# Patient Record
Sex: MALE | Race: WHITE | Employment: OTHER | URBAN - METROPOLITAN AREA
[De-identification: names, ages, dates, MRNs, and addresses within clinical notes are randomized per-mention and may not be internally consistent; named-entity substitution may affect disease eponyms.]

---

## 2023-11-20 ENCOUNTER — OFFICE VISIT (OUTPATIENT)
Age: 78
End: 2023-11-20
Payer: MEDICARE

## 2023-11-20 VITALS
HEIGHT: 74 IN | DIASTOLIC BLOOD PRESSURE: 74 MMHG | WEIGHT: 180 LBS | OXYGEN SATURATION: 93 % | SYSTOLIC BLOOD PRESSURE: 149 MMHG | BODY MASS INDEX: 23.1 KG/M2 | HEART RATE: 68 BPM | RESPIRATION RATE: 18 BRPM

## 2023-11-20 DIAGNOSIS — G30.1 MODERATE LATE ONSET ALZHEIMER'S DEMENTIA WITHOUT BEHAVIORAL DISTURBANCE, PSYCHOTIC DISTURBANCE, MOOD DISTURBANCE, OR ANXIETY (HCC): Primary | ICD-10-CM

## 2023-11-20 DIAGNOSIS — F02.B0 MODERATE LATE ONSET ALZHEIMER'S DEMENTIA WITHOUT BEHAVIORAL DISTURBANCE, PSYCHOTIC DISTURBANCE, MOOD DISTURBANCE, OR ANXIETY (HCC): Primary | ICD-10-CM

## 2023-11-20 DIAGNOSIS — G30.1 MODERATE LATE ONSET ALZHEIMER'S DEMENTIA WITHOUT BEHAVIORAL DISTURBANCE, PSYCHOTIC DISTURBANCE, MOOD DISTURBANCE, OR ANXIETY (HCC): ICD-10-CM

## 2023-11-20 DIAGNOSIS — F02.B0 MODERATE LATE ONSET ALZHEIMER'S DEMENTIA WITHOUT BEHAVIORAL DISTURBANCE, PSYCHOTIC DISTURBANCE, MOOD DISTURBANCE, OR ANXIETY (HCC): ICD-10-CM

## 2023-11-20 LAB
COMMENT:: NORMAL
SPECIMEN HOLD: NORMAL
T4 FREE SERPL-MCNC: 0.8 NG/DL (ref 0.8–1.5)
TSH SERPL DL<=0.05 MIU/L-ACNC: 5.4 UIU/ML (ref 0.36–3.74)
VIT B12 SERPL-MCNC: 371 PG/ML (ref 193–986)

## 2023-11-20 PROCEDURE — 4004F PT TOBACCO SCREEN RCVD TLK: CPT | Performed by: PSYCHIATRY & NEUROLOGY

## 2023-11-20 PROCEDURE — 99204 OFFICE O/P NEW MOD 45 MIN: CPT | Performed by: PSYCHIATRY & NEUROLOGY

## 2023-11-20 PROCEDURE — 1123F ACP DISCUSS/DSCN MKR DOCD: CPT | Performed by: PSYCHIATRY & NEUROLOGY

## 2023-11-20 PROCEDURE — G8420 CALC BMI NORM PARAMETERS: HCPCS | Performed by: PSYCHIATRY & NEUROLOGY

## 2023-11-20 PROCEDURE — G8428 CUR MEDS NOT DOCUMENT: HCPCS | Performed by: PSYCHIATRY & NEUROLOGY

## 2023-11-20 PROCEDURE — G8484 FLU IMMUNIZE NO ADMIN: HCPCS | Performed by: PSYCHIATRY & NEUROLOGY

## 2023-11-20 PROCEDURE — 96138 PSYCL/NRPSYC TECH 1ST: CPT | Performed by: PSYCHIATRY & NEUROLOGY

## 2023-11-20 PROCEDURE — 96132 NRPSYC TST EVAL PHYS/QHP 1ST: CPT | Performed by: PSYCHIATRY & NEUROLOGY

## 2023-11-20 RX ORDER — ROSUVASTATIN CALCIUM 20 MG/1
TABLET, COATED ORAL
COMMUNITY
Start: 2023-09-12

## 2023-11-20 RX ORDER — TAMSULOSIN HYDROCHLORIDE 0.4 MG/1
0.8 CAPSULE ORAL NIGHTLY
COMMUNITY
Start: 2022-04-06

## 2023-11-20 RX ORDER — LANOLIN ALCOHOL/MO/W.PET/CERES
500 CREAM (GRAM) TOPICAL NIGHTLY
COMMUNITY

## 2023-11-20 RX ORDER — DONEPEZIL HYDROCHLORIDE 5 MG/1
5 TABLET, FILM COATED ORAL NIGHTLY
Qty: 30 TABLET | Refills: 0 | Status: SHIPPED | OUTPATIENT
Start: 2023-11-20 | End: 2023-12-20

## 2023-11-20 RX ORDER — VIT C/B6/B5/MAGNESIUM/HERB 173 50-5-6-5MG
CAPSULE ORAL DAILY
COMMUNITY

## 2023-11-20 RX ORDER — DONEPEZIL HYDROCHLORIDE 10 MG/1
10 TABLET, FILM COATED ORAL DAILY
Qty: 90 TABLET | Refills: 3 | Status: SHIPPED | OUTPATIENT
Start: 2023-11-20

## 2023-11-20 RX ORDER — UBIDECARENONE 75 MG
CAPSULE ORAL
COMMUNITY

## 2023-11-20 RX ORDER — GABAPENTIN 600 MG/1
1200 TABLET ORAL NIGHTLY
COMMUNITY
Start: 2022-10-11

## 2023-11-20 RX ORDER — MULTIVIT WITH MINERALS/LUTEIN
1000 TABLET ORAL DAILY
COMMUNITY

## 2023-11-20 RX ORDER — SENNOSIDES A AND B 8.6 MG/1
1 TABLET, FILM COATED ORAL DAILY
COMMUNITY

## 2023-11-20 ASSESSMENT — PATIENT HEALTH QUESTIONNAIRE - PHQ9
2. FEELING DOWN, DEPRESSED OR HOPELESS: 0
SUM OF ALL RESPONSES TO PHQ QUESTIONS 1-9: 0
1. LITTLE INTEREST OR PLEASURE IN DOING THINGS: 0
SUM OF ALL RESPONSES TO PHQ9 QUESTIONS 1 & 2: 0
SUM OF ALL RESPONSES TO PHQ QUESTIONS 1-9: 0

## 2023-11-20 NOTE — PATIENT INSTRUCTIONS
[facility] Neuroscience Test Result Communication    Test results are available in [address] [place] [address]. [employer] is the patient portal into our electronic health record. This feature allows patients to see diagnostic test results, immunizations, allergies, past medical and surgical history, current medications, and send messages directly to providers. Our team members at the  can provide additional information and assist with registration. The [employer] support team can be reached at [phone]. In some cases, a provider might need time to explain the results in detail during a follow-up appointment. This might include additional information or context that will help patients understand the reason for next steps in the plan of care recommended by their provider. If a patient chooses to receive diagnostic testing at an imaging center outside of the [facility], it is the patient's responsibility to bring the imaging report and disc to their [address] [place] [address] follow-up appointment. If the test results reveal anything that is particularly noteworthy, we will contact you to discuss the matter and, if necessary, schedule a follow-up appointment at an earlier date. If you have not received your test results by [employer] or other communication within 7 days, please contact our office. An inquiry can be sent to your provider using [employer]. Alternatively, appointments can be scheduled via telephone to review results. If a follow-up appointment to review results has not been scheduled, [facility] office can be reached at [phone]. For appointments at our [facility] or [facility] office, please call [phone]. [address] [facility]   Statement to Patients  April 1, 2014      In an effort to ensure the large volume of patient prescription refills is processed in the most efficient and expeditious

## 2023-11-28 ENCOUNTER — TELEPHONE (OUTPATIENT)
Age: 78
End: 2023-11-28

## 2023-11-28 NOTE — TELEPHONE ENCOUNTER
----- Message from Luis Eduardo White MD sent at 11/21/2023 12:49 PM EST -----  Forward to PCP pls    ----- Message -----  From: Fanny Andino Incoming [employer]  Sent: 11/20/2023   8:40 PM EST  To: Luis Eduardo Whiet MD

## 2023-12-06 ENCOUNTER — PROCEDURE VISIT (OUTPATIENT)
Age: 78
End: 2023-12-06

## 2023-12-06 DIAGNOSIS — R41.0 CONFUSION: Primary | ICD-10-CM

## 2023-12-13 ENCOUNTER — HOSPITAL ENCOUNTER (OUTPATIENT)
Facility: HOSPITAL | Age: 78
Discharge: HOME | End: 2023-12-16
Attending: PSYCHIATRY & NEUROLOGY
Payer: MEDICARE

## 2023-12-13 DIAGNOSIS — F02.B0 MODERATE LATE ONSET ALZHEIMER'S DEMENTIA WITHOUT BEHAVIORAL DISTURBANCE, PSYCHOTIC DISTURBANCE, MOOD DISTURBANCE, OR ANXIETY (HCC): ICD-10-CM

## 2023-12-13 DIAGNOSIS — G30.1 MODERATE LATE ONSET ALZHEIMER'S DEMENTIA WITHOUT BEHAVIORAL DISTURBANCE, PSYCHOTIC DISTURBANCE, MOOD DISTURBANCE, OR ANXIETY (HCC): ICD-10-CM

## 2023-12-13 PROCEDURE — 70551 MRI BRAIN STEM W/O DYE: CPT

## 2023-12-15 ENCOUNTER — TELEPHONE (OUTPATIENT)
Age: 78
End: 2023-12-15

## 2023-12-15 DIAGNOSIS — J33.9 NASAL POLYP: Primary | ICD-10-CM

## 2023-12-15 NOTE — TELEPHONE ENCOUNTER
----- Message from Johnny Nj MD sent at 12/14/2023  8:13 AM EST -----  Pls refer to Dr. Judd Galeas sec to finding on MRI    ----- Message -----  From: Fanny Andino Incoming Orders Results To [employer]  Sent: 12/13/2023   9:56 PM EST  To: Johnny Nj MD

## 2023-12-15 NOTE — TELEPHONE ENCOUNTER
Pt and his wife notified about MRI report and referral.  Faxed order and report to Dr. Carlos Patel' office

## 2024-01-10 ENCOUNTER — OFFICE VISIT (OUTPATIENT)
Age: 79
End: 2024-01-10
Payer: MEDICARE

## 2024-01-10 VITALS
SYSTOLIC BLOOD PRESSURE: 125 MMHG | DIASTOLIC BLOOD PRESSURE: 61 MMHG | HEART RATE: 73 BPM | OXYGEN SATURATION: 95 % | RESPIRATION RATE: 16 BRPM

## 2024-01-10 DIAGNOSIS — G30.1 MODERATE LATE ONSET ALZHEIMER'S DEMENTIA WITHOUT BEHAVIORAL DISTURBANCE, PSYCHOTIC DISTURBANCE, MOOD DISTURBANCE, OR ANXIETY (HCC): Primary | ICD-10-CM

## 2024-01-10 DIAGNOSIS — F02.B0 MODERATE LATE ONSET ALZHEIMER'S DEMENTIA WITHOUT BEHAVIORAL DISTURBANCE, PSYCHOTIC DISTURBANCE, MOOD DISTURBANCE, OR ANXIETY (HCC): Primary | ICD-10-CM

## 2024-01-10 PROCEDURE — 4004F PT TOBACCO SCREEN RCVD TLK: CPT | Performed by: PSYCHIATRY & NEUROLOGY

## 2024-01-10 PROCEDURE — 1123F ACP DISCUSS/DSCN MKR DOCD: CPT | Performed by: PSYCHIATRY & NEUROLOGY

## 2024-01-10 PROCEDURE — G8427 DOCREV CUR MEDS BY ELIG CLIN: HCPCS | Performed by: PSYCHIATRY & NEUROLOGY

## 2024-01-10 PROCEDURE — G8420 CALC BMI NORM PARAMETERS: HCPCS | Performed by: PSYCHIATRY & NEUROLOGY

## 2024-01-10 PROCEDURE — 99214 OFFICE O/P EST MOD 30 MIN: CPT | Performed by: PSYCHIATRY & NEUROLOGY

## 2024-01-10 PROCEDURE — G8484 FLU IMMUNIZE NO ADMIN: HCPCS | Performed by: PSYCHIATRY & NEUROLOGY

## 2024-01-10 NOTE — PROGRESS NOTES
Patient is here to go over memory work up. Patient did remember coming into the office and it being decorated for the holidays. The patient did also remember my face from the first visit.   Patient had the inspire placed 12/22/23 and it will be turned in the next few weeks.   
been going well in that regard.  They did have to reschedule the driving evaluation with a we will get that rescheduled.    His Workup has included  Carotid Doppler with insignificant stenosis  MRI of the brain with a lesion in the right nasal cavity.  Unremarkable brain parenchyma with age-related volume loss and microvascular change.  EEG with a slow background at 7 cps    TSH was elevated at 5.4  Free T4 normal  B12 normal    Laboratory analysis from Dr. Kelly November 30, 2023  Thyroid peroxidase antibody normal  Thyroglobulin antibody unremarkable  Thyrotropin receptor unremarkable  Direct antiglobulin test negative  Reticulocytes low  B12 repeat 294  Transferrin low at 189  Iron normal  Folate normal  Ferritin elevated 465  TSH normal  Free T4 normal  T3 normal  Lipid panel with an LDL 60  Magnesium normal  Comprehensive metabolic profile unremarkable    30 November 2023 visit with Dr. Kelly where abnormal thyroid function tests were noted.  He was sent for further testing as above.  Also dyslipidemia and labs were checked.  Anemia and labs were checked.  Otolaryngology visit with Dr. Rodriguez December 22, 2023 where the patient had implantation of the inspire hypoglossal nerve stimulator for his obstructive sleep apnea    Office visit with Dr. Dinesh Perez of [facility] where he evaluated Mr. Johnson regarding the polyp.  A biopsy was planned.    Examination  /61 (Site: Right Upper Arm, Position: Sitting, Cuff Size: Medium Adult)   Pulse 73   Resp 16   SpO2 95%   He is a pleasant engaging gentleman.  He is awake and alert.  He discusses the current events of his medical history.    Impression/Plan  78-year-old gentleman likely mild/moderate Alzheimer's type dementia  Continue with Aricept 10 mg daily  Follow-up 6 months    Radha Shepard MD        This note was created using voice recognition software. Despite editing, there may be syntax errors.

## 2024-01-26 ENCOUNTER — HOSPITAL ENCOUNTER (OUTPATIENT)
Facility: HOSPITAL | Age: 79
Setting detail: SPECIMEN
Discharge: HOME | End: 2024-01-29

## 2024-03-15 RX ORDER — DONEPEZIL HYDROCHLORIDE 5 MG/1
5 TABLET, FILM COATED ORAL NIGHTLY
Qty: 30 TABLET | Refills: 0 | OUTPATIENT
Start: 2024-03-15 | End: 2024-04-14

## 2024-06-12 RX ORDER — DONEPEZIL HYDROCHLORIDE 5 MG/1
5 TABLET, FILM COATED ORAL NIGHTLY
Qty: 30 TABLET | Refills: 0 | OUTPATIENT
Start: 2024-06-12 | End: 2024-07-12

## 2024-07-10 ENCOUNTER — OFFICE VISIT (OUTPATIENT)
Age: 79
End: 2024-07-10

## 2024-07-10 VITALS
SYSTOLIC BLOOD PRESSURE: 163 MMHG | OXYGEN SATURATION: 95 % | DIASTOLIC BLOOD PRESSURE: 89 MMHG | HEART RATE: 63 BPM | RESPIRATION RATE: 16 BRPM

## 2024-07-10 DIAGNOSIS — G30.9 ALZHEIMER'S DISEASE, UNSPECIFIED (CODE) (HCC): Primary | ICD-10-CM

## 2024-07-10 DIAGNOSIS — R41.3 MEMORY DIFFICULTY: ICD-10-CM

## 2024-07-10 RX ORDER — MEMANTINE HYDROCHLORIDE 10 MG/1
10 TABLET ORAL 2 TIMES DAILY
Qty: 180 TABLET | Refills: 1 | Status: SHIPPED | OUTPATIENT
Start: 2024-07-10

## 2024-07-10 RX ORDER — MEMANTINE HYDROCHLORIDE 5 MG/1
TABLET ORAL
Qty: 42 TABLET | Refills: 0 | Status: SHIPPED | OUTPATIENT
Start: 2024-07-10

## 2024-07-10 RX ORDER — DONEPEZIL HYDROCHLORIDE 10 MG/1
10 TABLET, FILM COATED ORAL DAILY
Qty: 90 TABLET | Refills: 3 | Status: SHIPPED | OUTPATIENT
Start: 2024-07-10

## 2024-07-10 NOTE — PROGRESS NOTES
[address]88[address] (16 minute minimum)  20 minutes were spent administering cognitive testing by nurse.    Cognitive Testing Evaluation     Introduction:     Serjio Johnson  1945  Male   This 79 year old Male was administered a battery of neurocognitive testing on 07/10/2024.     Tests Administered:     Trails A, Trails B, Digit Symbol Substitution, Stroop, Immediate Recognition, Delayed Recognition   The combined test administration time was 16 minutes   Test Results:   Cognitive testing was provided via a battery of cognitive assessments. The pattern of test scores indicate that results are valid.  A Clinical Report with further description of scores and results is also available.   Overall: Patient tested in the 1st* percentile (standard score of 64*).   Trails A: Patient tested in the 21st percentile (scaled standard score of 88).  Trails B: Patient tested in the --* percentile (scaled standard score of null).  Digit Symbol Substitution: Patient tested in the 1st percentile (scaled standard score of 10).  Stroop: Patient tested in the 12th percentile (scaled standard score of 83).  Immediate Recognition: Patient tested in the 1st percentile (scaled standard score of 66).  Delayed Recognition: Patient tested in the 44th percentile (scaled standard score of 98).   * These assessments were not scored because they were potentially invalid, or the patient failed to complete in the allotted time.   Interpretation of Test Scores:     Examination of individual component tests shows:   Attention - Trails A: unlikely impairment  Mental Flexibility - Trails B: possible impairment  Executive Function - Digit Symbol Substitution: likely impairment  Executive Function - Stroop: possible impairment  Memory - Immediate Recognition: likely impairment  Memory - Delayed Recognition: unlikely impairment    The patient’s overall cognitive test performance was in the 1st percentile when compared to individuals of a similar age and 
Luis from Nov    Overall: Patient tested in the 3rd* percentile (standard score of 72*   
formalformal driving evaluation.  See scanned media.  Memory has been stable    Previous diagnostics  Carotid Doppler with insignificant stenosis  MRI of the brain with age-related microvascular changes including the microhemorrhages as noted above.  Age-related volume loss and microvascular change.  EEG with a slow background at 7 cps     TSH 5.4  Free T4 normal  B12 normal    Examination  BP (!) 163/89   Pulse 63   Resp 16   SpO2 95%   Pleasant engaging gentleman.  He is awake alert oriented to the correct day date and year.  He discusses the politics ongoing with Maryann and Kadeem.  He notes the leg[place] border [place] being something prominent in the news.    [address] (16 minute minimum)  20 minutes were spent administering cognitive testing by nurse.     Cognitive Testing Evaluation   Introduction:      Serjio Johnson  1945  Male   This 79 year old Male was administered a battery of neurocognitive testing on 07/10/2024.      Tests Administered:      Trails A, Trails B, Digit Symbol Substitution, Stroop, Immediate Recognition, Delayed Recognition   The combined test administration time was 16 minutes   Test Results:   Cognitive testing was provided via a battery of cognitive assessments. The pattern of test scores indicate that results are valid.  A Clinical Report with further description of scores and results is also available.   Overall: Patient tested in the 1st* percentile (standard score of 64*).   Trails A: Patient tested in the 21st percentile (scaled standard score of 88).  Trails B: Patient tested in the --* percentile (scaled standard score of null).  Digit Symbol Substitution: Patient tested in the 1st percentile (scaled standard score of 10).  Stroop: Patient tested in the 12th percentile (scaled standard score of 83).  Immediate Recognition: Patient tested in the 1st percentile (scaled standard score of 66).  Delayed Recognition: Patient tested in the 44th percentile (scaled

## 2024-07-29 RX ORDER — MEMANTINE HYDROCHLORIDE 5 MG/1
TABLET ORAL
Qty: 42 TABLET | Refills: 0 | OUTPATIENT
Start: 2024-07-29

## 2024-10-28 RX ORDER — MEMANTINE HYDROCHLORIDE 10 MG/1
10 TABLET ORAL 2 TIMES DAILY
Qty: 180 TABLET | Refills: 1 | Status: SHIPPED | OUTPATIENT
Start: 2024-10-28

## 2024-10-28 RX ORDER — MEMANTINE HYDROCHLORIDE 5 MG/1
TABLET ORAL
Qty: 42 TABLET | Refills: 0 | OUTPATIENT
Start: 2024-10-28

## 2025-01-15 ENCOUNTER — OFFICE VISIT (OUTPATIENT)
Age: 80
End: 2025-01-15
Payer: MEDICARE

## 2025-01-15 VITALS
HEART RATE: 93 BPM | RESPIRATION RATE: 16 BRPM | BODY MASS INDEX: 23.1 KG/M2 | SYSTOLIC BLOOD PRESSURE: 149 MMHG | HEIGHT: 74 IN | OXYGEN SATURATION: 64 % | DIASTOLIC BLOOD PRESSURE: 78 MMHG | WEIGHT: 180 LBS

## 2025-01-15 DIAGNOSIS — G30.1 MODERATE LATE ONSET ALZHEIMER'S DEMENTIA WITH AGITATION (HCC): Primary | ICD-10-CM

## 2025-01-15 DIAGNOSIS — F02.B11 MODERATE LATE ONSET ALZHEIMER'S DEMENTIA WITH AGITATION (HCC): Primary | ICD-10-CM

## 2025-01-15 PROCEDURE — G8427 DOCREV CUR MEDS BY ELIG CLIN: HCPCS | Performed by: PSYCHIATRY & NEUROLOGY

## 2025-01-15 PROCEDURE — 1159F MED LIST DOCD IN RCRD: CPT | Performed by: PSYCHIATRY & NEUROLOGY

## 2025-01-15 PROCEDURE — 99214 OFFICE O/P EST MOD 30 MIN: CPT | Performed by: PSYCHIATRY & NEUROLOGY

## 2025-01-15 PROCEDURE — 1036F TOBACCO NON-USER: CPT | Performed by: PSYCHIATRY & NEUROLOGY

## 2025-01-15 PROCEDURE — 1126F AMNT PAIN NOTED NONE PRSNT: CPT | Performed by: PSYCHIATRY & NEUROLOGY

## 2025-01-15 PROCEDURE — 1123F ACP DISCUSS/DSCN MKR DOCD: CPT | Performed by: PSYCHIATRY & NEUROLOGY

## 2025-01-15 PROCEDURE — G8420 CALC BMI NORM PARAMETERS: HCPCS | Performed by: PSYCHIATRY & NEUROLOGY

## 2025-01-15 RX ORDER — BUDESONIDE 3 MG/1
CAPSULE, COATED PELLETS ORAL
COMMUNITY
Start: 2024-08-15

## 2025-01-15 RX ORDER — DONEPEZIL HYDROCHLORIDE 10 MG/1
10 TABLET, FILM COATED ORAL DAILY
Qty: 90 TABLET | Refills: 3 | Status: SHIPPED | OUTPATIENT
Start: 2025-01-15

## 2025-01-15 RX ORDER — MEMANTINE HYDROCHLORIDE 10 MG/1
10 TABLET ORAL 2 TIMES DAILY
Qty: 180 TABLET | Refills: 3 | Status: SHIPPED | OUTPATIENT
Start: 2025-01-15

## 2025-01-15 NOTE — PROGRESS NOTES
[facility] at [facility] at [address] [place] [address] [address] [facility] [address] [place] [address] [phone]              Chief Complaint   Patient presents with    Progressive dementia likely Alzheimer's type     Memantine 10 mg BID, Donepezil 10 mg daily      Current Outpatient Medications   Medication Sig Dispense Refill    budesonide (ENTOCORT EC) 3 MG delayed release capsule Take 3 capsule every morning daily x 4 weeks, then taper down to 2 capsules every morning daily x 2 weeks, then taper down to 1 capsule every morning daily x 2 week      Multiple Vitamin (MULTIVITAMIN PO) Take by mouth      FOLIC ACID PO Take by mouth      memantine (NAMENDA) 10 MG tablet TAKE 1 TABLET BY MOUTH TWICE A  tablet 1    donepezil (ARICEPT) 10 MG tablet Take 1 tablet by mouth daily 90 tablet 3    rosuvastatin (CRESTOR) 20 MG tablet TAKE 1 TABLET BY MOUTH DAILY. REPLACES 10 MG DOSE.      tamsulosin (FLOMAX) 0.4 MG capsule Take 2 capsules by mouth nightly       No current facility-administered medications for this visit.      No Known Allergies  Social History     Tobacco Use    Smoking status: Never    Smokeless tobacco: Never   Substance Use Topics    Alcohol use: Yes     Alcohol/week: 6.0 standard drinks of alcohol     Types: 6 Glasses of wine per week     Comment: Glass of wine with Dinner    Drug use: Never     History of Present Illness  80-year-old gentleman following up today for dementia progressive Alzheimer's type.  He is not a candidate for antiamyloid therapy secondary to microhemorrhages on his MRI scan.  Last visit we continued Aricept.  Namenda was added.  Today his wife notes that perhaps a bit more forgetful but he is not bad.  He reads the paper daily.  They watch the news.  He can discuss events in the news.  He has had some agitation and she questions use of

## 2025-01-15 NOTE — PROGRESS NOTES
[address]673 (16 minute minimum)  20 minutes were spent administering cognitive testing by medical assistant/nurse.    Cognitive Testing Evaluation     Introduction:     Serjio Johnson  1945  Male   This 80 year old Male was administered a battery of neurocognitive testing on 01/15/2025.     Tests Administered:     Trails A, Trails B, Digit Symbol Substitution, Stroop, Immediate Recognition, Delayed Recognition   The combined test administration time was not recorded   Test Results:   Cognitive testing was provided via a battery of cognitive assessments. The pattern of test scores indicate that results are valid.  A Clinical Report with further description of scores and results is also available.   Overall: Patient tested in the 1st* percentile (standard score of 57*).   Trails A: Patient tested in the 1st percentile (scaled standard score of 53).  Trails B: Patient tested in the --* percentile (scaled standard score of null).  Digit Symbol Substitution: Patient tested in the 7th percentile (scaled standard score of 78).  Stroop: Patient tested in the 16th percentile (scaled standard score of 85).  Immediate Recognition: Patient tested in the 1st percentile (scaled standard score of 47).  Delayed Recognition: Patient tested in the 44th percentile (scaled standard score of 98).   * These assessments were not scored because they were potentially invalid, or the patient failed to complete in the allotted time.   Interpretation of Test Scores:     Examination of individual component tests shows:   Attention - Trails A: likely impairment  Mental Flexibility - Trails B: possible impairment  Executive Function - Digit Symbol Substitution: possible impairment  Executive Function - Stroop: unlikely impairment  Memory - Immediate Recognition: likely impairment  Memory - Delayed Recognition: unlikely impairment    The patient’s overall cognitive test performance was in the 1st percentile when compared to individuals of a

## 2025-04-17 NOTE — PROGRESS NOTES
Serjio Johnson is a 80 y.o. male who presents with the following  Chief Complaint   Patient presents with    Follow-up     Med william started on rexulti, moderate alzheimers      Last office visit note  HPI  Patient is here today with his wife for Alzheimer's follow-up.  Patient was last seen January 15, 2025 by Dr. Shepard at which time the patient had been established on Aricept 10 mg nightly and Namenda 10 mg twice a day.  The patient's wife stated that he had had some agitation such as yelling and even swatted at her once.  She asked about Rexulti.  Dr. Shepard did a new brain check with the patient and received a lower score than he did at the previous visit (57 versus 64).  Dr. Shepard continued his medications and added Rexulti titrated to 2 mg daily.  Today the patient's wife states that he is still taking all of his medicines including the Rexulti and that he has tolerated it well.  She says that she does feel like the Rexulti has been helping with his agitation.  She says that he does not yell as much and he has not swatted at her again.  The patient agrees that he feels it is helpful.  The patient's wife does feel that the memory may be a little worse as recently he did have difficulty remembering a family member's name until she gave him a hint.  Today the patient could tell me his date of birth, where he is, the floor that we are on, the day of the week, the full date of today, the current president's name.    He did have difficulty telling me the season for stating that it was summer and then when his wife intervened he did say spring, he also had difficulty calculating coins, and could not tell me who the president was with a hint that the last name started with b.  The patient's wife then went on to [employer] and then he was able to finish the sentence with Maryann.  No Known Allergies    Current Outpatient Medications   Medication Sig Dispense Refill    brexpiprazole (REXULTI) 2 MG TABS tablet Take

## 2025-04-18 ENCOUNTER — OFFICE VISIT (OUTPATIENT)
Age: 80
End: 2025-04-18
Payer: MEDICARE

## 2025-04-18 VITALS
DIASTOLIC BLOOD PRESSURE: 76 MMHG | OXYGEN SATURATION: 95 % | HEART RATE: 67 BPM | SYSTOLIC BLOOD PRESSURE: 136 MMHG | RESPIRATION RATE: 20 BRPM

## 2025-04-18 DIAGNOSIS — F02.B11 MODERATE LATE ONSET ALZHEIMER'S DEMENTIA WITH AGITATION (HCC): Primary | ICD-10-CM

## 2025-04-18 DIAGNOSIS — G30.1 MODERATE LATE ONSET ALZHEIMER'S DEMENTIA WITH AGITATION (HCC): Primary | ICD-10-CM

## 2025-04-18 PROCEDURE — 99213 OFFICE O/P EST LOW 20 MIN: CPT

## 2025-05-07 ENCOUNTER — TELEPHONE (OUTPATIENT)
Age: 80
End: 2025-05-07

## 2025-05-07 NOTE — TELEPHONE ENCOUNTER
HIPAA Verified (if caller is someone other than patient):   [] Yes  [] No  [x] N/A     Reason for Call: Document the reason for call:   Jade calling from [facility] stating the physical therapist is concerned about  him still driving.    She need some information pertaining to his cognitive state of mind.    Her fax number - [phone]      Level 1 Calls - attempted to reach practice?  [] Yes   [] No  [x] N/A    Reason Call Marked High Priority (if applicable):

## 2025-05-08 NOTE — TELEPHONE ENCOUNTER
Returned call and LM for Jade.  Since they are [facility], may see our notes/scans in media.  There was referral to [facility] for 's eval however, I see no report of eval.  Pt did have eval with [facility] in 2024.  If they have concerns with pt's driving, they may send referral to [facility] since he is already rec'v PT with them for OT eval and check driving simulator.

## 2025-08-05 ENCOUNTER — TRANSCRIBE ORDERS (OUTPATIENT)
Facility: HOSPITAL | Age: 80
End: 2025-08-05

## 2025-08-05 DIAGNOSIS — M54.16 RADICULOPATHY OF LUMBAR REGION: Primary | ICD-10-CM

## 2025-08-05 DIAGNOSIS — M43.16 SPONDYLOLISTHESIS OF LUMBAR REGION: ICD-10-CM

## 2025-08-20 ENCOUNTER — HOSPITAL ENCOUNTER (OUTPATIENT)
Facility: HOSPITAL | Age: 80
Discharge: HOME | End: 2025-08-23
Attending: ORTHOPAEDIC SURGERY

## 2025-08-20 DIAGNOSIS — M54.16 RADICULOPATHY OF LUMBAR REGION: ICD-10-CM

## 2025-08-20 DIAGNOSIS — M43.16 SPONDYLOLISTHESIS OF LUMBAR REGION: ICD-10-CM

## 2025-08-21 ENCOUNTER — HOSPITAL ENCOUNTER (OUTPATIENT)
Facility: HOSPITAL | Age: 80
Discharge: HOME | End: 2025-08-24
Attending: ORTHOPAEDIC SURGERY
Payer: MEDICARE

## 2025-08-21 DIAGNOSIS — M43.16 SPONDYLOLISTHESIS OF LUMBAR REGION: ICD-10-CM

## 2025-08-21 DIAGNOSIS — M54.16 RADICULOPATHY OF LUMBAR REGION: ICD-10-CM

## 2025-08-21 PROCEDURE — 72148 MRI LUMBAR SPINE W/O DYE: CPT

## 2025-08-21 PROCEDURE — 71045 X-RAY EXAM CHEST 1 VIEW: CPT

## 2025-08-22 ENCOUNTER — TELEPHONE (OUTPATIENT)
Age: 80
End: 2025-08-22